# Patient Record
Sex: FEMALE | URBAN - METROPOLITAN AREA
[De-identification: names, ages, dates, MRNs, and addresses within clinical notes are randomized per-mention and may not be internally consistent; named-entity substitution may affect disease eponyms.]

---
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
## 2022-08-09 ENCOUNTER — NURSE TRIAGE (OUTPATIENT)
Dept: NURSING | Facility: CLINIC | Age: 22
End: 2022-08-09

## 2022-08-10 NOTE — TELEPHONE ENCOUNTER
6:30 pm - Moira took a home Covid test and is concerned that she might have inserted the swab too far in her Lt nostril.  (Covid test = Negative)    She believes that she inserted the swab slightly past her nostril. She is concerned that she might have caused serious damage    She had a mild generalized headache prior to the test.  Since taking the test, she has a more pronounced headache, primarily to the Lt forehead - Rated 4-5/10    Denies: Bleeding, Blurred vision, Facial droop/Asymmetry or Nausea    She took 1 Ibuprofen earlier and noted improvement in the discomfort    Advised that she likely caused some local irritation. Suggested  - Continue with OTC pain relievers, as needed  - Consider Ice pack to forehead &/or nose  - Saline nasal spray or Irrigation    Call back for worsening pain or new onset symptoms    Danielle Akhtar RN  [facility]      Reason for Disposition    Nursing judgment    Protocols used: NO GUIDELINE OR REFERENCE [id number]